# Patient Record
Sex: FEMALE | Race: WHITE | URBAN - METROPOLITAN AREA
[De-identification: names, ages, dates, MRNs, and addresses within clinical notes are randomized per-mention and may not be internally consistent; named-entity substitution may affect disease eponyms.]

---

## 2018-03-28 ENCOUNTER — HISTORICAL (OUTPATIENT)
Dept: ADMINISTRATIVE | Facility: HOSPITAL | Age: 28
End: 2018-03-28

## 2018-03-28 LAB
ABS NEUT (OLG): 4.6
ALBUMIN SERPL-MCNC: 4.6 GM/DL (ref 3.4–5)
ALBUMIN/GLOB SERPL: 2.09 {RATIO} (ref 1.5–2.5)
ALP SERPL-CCNC: 39 UNIT/L (ref 38–126)
ALT SERPL-CCNC: 8 UNIT/L (ref 7–52)
AST SERPL-CCNC: 15 UNIT/L (ref 15–37)
BILIRUB SERPL-MCNC: 1 MG/DL (ref 0.2–1)
BILIRUBIN DIRECT+TOT PNL SERPL-MCNC: 0.2 MG/DL (ref 0–0.5)
BUN SERPL-MCNC: 9 MG/DL (ref 7–18)
CALCIUM SERPL-MCNC: 9.3 MG/DL (ref 8.5–10)
CHLORIDE SERPL-SCNC: 103 MMOL/L (ref 98–107)
CHOLEST SERPL-MCNC: 184 MG/DL (ref 0–200)
CHOLEST/HDLC SERPL: 1.9 {RATIO}
CO2 SERPL-SCNC: 26 MMOL/L (ref 21–32)
CREAT SERPL-MCNC: 0.52 MG/DL (ref 0.6–1.3)
CREAT/UREA NIT SERPL: 17.3
DEPRECATED CALCIDIOL+CALCIFEROL SERPL-MC: 14.5 NG/ML (ref 30–80)
ERYTHROCYTE [DISTWIDTH] IN BLOOD BY AUTOMATED COUNT: 13.3 % (ref 11.5–17)
GGT SERPL-CCNC: 18 UNIT/L (ref 5–85)
GLOBULIN SER-MCNC: 2.2 GM/DL (ref 1.2–3)
GLUCOSE SERPL-MCNC: 93 MG/DL (ref 74–106)
HCT VFR BLD AUTO: 41 % (ref 37–47)
HDLC SERPL-MCNC: 98 MG/DL (ref 35–60)
HGB BLD-MCNC: 13.2 GM/DL (ref 12–16)
LDH SERPL-CCNC: 145 UNIT/L (ref 140–271)
LDLC SERPL CALC-MCNC: 62 MG/DL (ref 0–129)
LYMPHOCYTES # BLD AUTO: 2.1 X10(3)/MCL (ref 0.6–3.4)
LYMPHOCYTES NFR BLD AUTO: 28.7 % (ref 13–40)
MCH RBC QN AUTO: 29 PG (ref 27–31.2)
MCHC RBC AUTO-ENTMCNC: 32 GM/DL (ref 32–36)
MCV RBC AUTO: 90 FL (ref 80–94)
MONOCYTES # BLD AUTO: 0.7 X10(3)/MCL (ref 0–1.8)
MONOCYTES NFR BLD AUTO: 9.7 % (ref 0.1–24)
NEUTROPHILS NFR BLD AUTO: 61.6 % (ref 47–80)
PLATELET # BLD AUTO: 360 X10(3)/MCL (ref 130–400)
PMV BLD AUTO: 8.7 FL
POTASSIUM SERPL-SCNC: 3.9 MMOL/L (ref 3.5–5.1)
PROT SERPL-MCNC: 6.8 GM/DL (ref 6.4–8.2)
RBC # BLD AUTO: 4.55 X10(6)/MCL (ref 4.2–5.4)
SODIUM SERPL-SCNC: 136 MMOL/L (ref 136–145)
TRIGL SERPL-MCNC: 61 MG/DL (ref 30–150)
TSH SERPL-ACNC: 0.62 MIU/ML (ref 0.35–4.94)
VLDLC SERPL CALC-MCNC: 12.2 MG/DL
WBC # SPEC AUTO: 7.4 X10(3)/MCL (ref 4.5–11.5)

## 2022-04-10 ENCOUNTER — HISTORICAL (OUTPATIENT)
Dept: ADMINISTRATIVE | Facility: HOSPITAL | Age: 32
End: 2022-04-10

## 2022-04-26 VITALS
HEIGHT: 61 IN | BODY MASS INDEX: 21.56 KG/M2 | DIASTOLIC BLOOD PRESSURE: 84 MMHG | WEIGHT: 114.19 LBS | SYSTOLIC BLOOD PRESSURE: 126 MMHG

## 2022-05-02 NOTE — HISTORICAL [facility] CERNER
This is a historical note converted from [employer]. Formatting and pictures may have been removed.  Please reference [employer] for original formatting and attached multimedia. Chief Complaint  WELLNESS CPX FAST  History of Present Illness  Here for annual wellness.  ?  Complains of worsening anxiety past few months. Lives alone and currently unemployed. No SI/HI.  Gets so overwhelmed and nervous about driving in [place], she usually doesnt even show up for job interviews.  Review of Systems  Constitutional:?no fever, no weakness, no weight loss, no fatigue  Eye:?no eye redness, no visual changes, or eye pain  ENMT:?no nasal congestion, sore throat, or ear pain  Respiratory:?no wheezing,?no shortness of breath  Cardiovascular:?no chest pain, no CHAPIN  Gastrointestinal:?no nausea, vomiting, or diarrhea. No abdominal pain, no hematochezia or melena  :?no dysuria, hematuria, frequency, urgency, or incontinence  Musculoskeletal:?no muscle or joint pain, no joint swelling  Integumentary:?no skin rash or abnormal lesion  Neuro:?no headaches, dizziness, or weakness  Psych:?no anxiety, depression, or SI/HI  Endo:?no polyuria, polydipsia, or polyphagia  Heme/Lymph:?no bruising or lymphadenopathy  ?  Physical Exam  Vitals & Measurements  T:?37? ?C (Temporal Artery)? HR:?68(Peripheral)? BP:?126/84?  HT:?154?cm? HT:?154?cm? WT:?51.8?kg? WT:?51.8?kg? BMI:?21.84?  General:?Well developed, well-nourished, in no acute distress  Eye:?clear conjunctiva, eyelids normal  HENT:?no erythema, no exudate or swelling, TMs clear  Neck:?full range of motion, no cervical lymphadenopathy  Respiratory:?clear to auscultation bilaterally  Cardiovascular:?regular rate and rhythm without murmurs, gallops or rubs  Abdomen:?soft, NT, ND, NABS x4, no HSM  M/S:?no tenderness, joints WNL, FROM, neg SLR, no CCE  Neuro:?no motor/sensory deficits, Reflexes 2+ throughout, CN II-XII intact  Integumentary:?no rashes or skin lesions  present  LN:?WNL  ?  Assessment/Plan  1.?Wellness examination  ?LABS: CBC, CMP, TSH  Ordered:  CBC w/ Auto Diff, Routine collect, 03/28/18 14:29:00 CDT, Blood, Order for future visit, Stop date 03/28/18 14:29:00 CDT, Lab Collect, Wellness examination, 03/28/18 14:29:00 CDT  CMP, Routine collect, 03/28/18 14:29:00 CDT, Blood, Order for future visit, Stop date 03/28/18 14:29:00 CDT, Lab Collect, Wellness examination, 03/28/18 14:29:00 CDT  Lab Collection Request, 03/28/18 14:29:00 CDT, "[facility], 03/28/18 14:29:00 CDT  Lipid Panel, Routine collect, 03/28/18 14:29:00 CDT, Blood, Order for future visit, Stop date 03/28/18 14:29:00 CDT, Lab Collect, Wellness examination, 03/28/18 14:29:00 CDT  Preventative Health Care Est 18-39 years [id number] PC, Wellness examination, "[facility], 03/28/18 14:29:00 CDT  Thyroid Stimulating Hormone, Routine collect, 03/28/18 14:29:00 CDT, Blood, Order for future visit, Stop date 03/28/18 14:29:00 CDT, Lab Collect, Wellness examination, 03/28/18 14:29:00 CDT  Vitamin D, 25-Hydroxy Level, Routine collect, 03/28/18 14:29:00 CDT, Blood, Order for future visit, Stop date 03/28/18 14:29:00 CDT, Lab Collect, Wellness examination, 03/28/18 14:29:00 CDT  ?  2.?GERD - Gastro-esophageal reflux disease  ?Nexium 40mg PO q day  ?Zantac 150mg PO q day  ?  3.?Insomnia  ?Continue Ambien 10mg q HS? (30, 5)  ?  4.?Anxiety  ?Start Celexa 20mg PO q day (30, 5)  ?  Orders:  zolpidem, 10 mg = 1 tab(s), Oral, qPM, # 30 tab(s), 5 Refill(s), Pharmacy: [facility]/pharmacy #[id number]  Clinic Follow-up PRN, 03/28/18 14:29:00 CDT, [facility], Future Order   Problem List/Past Medical History  Ongoing  Anxiety  Fatigue  GERD - Gastro-esophageal reflux disease  Insomnia  Historical  No qualifying data  Medications  Celexa 20 mg oral tablet, 20 mg= 1 tab(s), Oral, Daily, 5 refills  NAPROXEN SODIUM 550 MG TAB, 550 mg= 1 tab(s), Oral, BID  NexIUM 40 mg oral delayed release capsule, 40 mg= 1 cap(s), Oral, Daily  raNITIdine  150 mg oral capsule, 150 mg= 1 cap(s), Oral, Once a day (at bedtime)  zolpidem 10 mg oral tablet, 10 mg= 1 tab(s), Oral, qPM, 5 refills  ZyrTEC, Daily  Allergies  azithromycin?(Unknown)  sulfa drugs?(Unknown)  Social History  Alcohol  Current, 1-2 times per month, 03/28/2018  Employment/School  Unemployed, 03/28/2018  Exercise  Exercise duration: 30. Exercise frequency: 3-4 times/week. Exercise type: Yoga., 03/28/2018  Home/Environment  Lives with Alone., 03/28/2018  Nutrition/Health  Regular, 03/28/2018  Substance Abuse  Never, 03/28/2018  Tobacco  Never smoker, 03/28/2018  Family History  Alcoholism.: Father.  Asthma.: Mother.  Depression: Mother.  Hypertension.: Mother.